# Patient Record
Sex: FEMALE | Race: WHITE
[De-identification: names, ages, dates, MRNs, and addresses within clinical notes are randomized per-mention and may not be internally consistent; named-entity substitution may affect disease eponyms.]

---

## 2017-11-18 ENCOUNTER — HOSPITAL ENCOUNTER (EMERGENCY)
Dept: HOSPITAL 10 - FTE | Age: 32
Discharge: HOME | End: 2017-11-18
Payer: COMMERCIAL

## 2017-11-18 VITALS
BODY MASS INDEX: 38.96 KG/M2 | HEIGHT: 61 IN | WEIGHT: 206.35 LBS | WEIGHT: 206.35 LBS | BODY MASS INDEX: 38.96 KG/M2 | HEIGHT: 61 IN

## 2017-11-18 DIAGNOSIS — L50.0: Primary | ICD-10-CM

## 2017-11-18 PROCEDURE — 96372 THER/PROPH/DIAG INJ SC/IM: CPT

## 2017-11-18 NOTE — ERD
ER Documentation


Chief Complaint


Chief Complaint


PT HAS HIVES ALLERGY NO SOB,





HPI


Neuro male presents with an allergic reaction.  He has not had allergies much 

in her life over the last 30 days she started to get allergic reactions where 

she gets very itchy and gets redness and spots on her skin.  Today she had a 

reaction where hot water with lemon cleared up completely.  She had another 

reaction when she actually believed her tongue was swelling.  She states that 

that is completely resolved her itching is getting better but she still is red 

and very itchy.  She cannot think of any known changes or allergens.  She 

started spoken with her doctor who recommends allergy testing.





ROS


All systems reviewed and are negative except as per history of present illness.





Medications


Home Meds


Active Scripts


Epinephrine (Epipen 2-Ernesto) 0.3 Mg/0.3 Ml Pen.injctr, 0.3 MG IM AS DIRECTED Y 

for ALLERGIC REACTION, #1 EA


   Prov:LEONEL ROYAL DO         11/18/17


Prednisone* (Prednisone*) 20 Mg Tab, 40 MG PO DAILY, #4 TAB


   Prov:LEONEL ROYAL DO         11/18/17


Famotidine* (Famotidine*) 20 Mg Tablet, 20 MG PO BID, #8 TAB


   Prov:LEONEL ROYAL DO         11/18/17


Hydroxyzine Hcl* (Atarax*) 25 Mg Tab, 25 MG PO Q6H, #20 TAB


   Prov:LEONEL ROYAL DO         11/18/17





PMhx/Soc


Medical and Surgical Hx:  pt denies Medical Hx, pt denies Surgical Hx


Hx Alcohol Use:  No


Hx Substance Use:  No


Hx Tobacco Use:  No


Smoking Status:  Never smoker





Physical Exam


Vitals





Vital Signs








  Date Time  Temp Pulse Resp B/P Pulse Ox O2 Delivery O2 Flow Rate FiO2


 


11/18/17 20:29 99.4 106 18 180/84 100   








Physical Exam


Const:  []          Mild to moderate distress, scratching skin persistently.


Head:   Atraumatic 


Eyes:    Normal Conjunctiva


ENT:    Normal External Ears, Nose and Mouth.  Tongue is normal size, 

oropharynx is within normal limits with no edema.


Resp:    Clear to auscultation bilaterally


Cardio:    Regular rate and rhythm, no murmurs


Skin:    No petechiae, erythema and blotches different parts of the trunk 

extremities and neck.


Ext:    No cyanosis, or edema


Results 24 hrs





 Current Medications








 Medications


  (Trade)  Dose


 Ordered  Sig/Chito


 Route


 PRN Reason  Start Time


 Stop Time Status Last Admin


Dose Admin


 


 Diphenhydramine


 HCl


  (Benadryl)  50 mg  ONCE  ONCE


 IM


   11/18/17 22:00


 11/18/17 22:01 DC 11/18/17 22:13


 


 


 Prednisone


  (Prednisone)  60 mg  ONCE  ONCE


 PO


   11/18/17 22:00


 11/18/17 22:01 DC 11/18/17 22:13


 


 


 Famotidine


  (Pepcid)  20 mg  ONCE  ONCE


 PO


   11/18/17 22:00


 11/18/17 22:01 DC 11/18/17 22:13


 











Procedures/MDM


Allergic reaction appears to be moderate and possibly was severe as her tongue 

was swelling.  She has no shortness of breath now has clear lungs had erythema.

  She was given 50 mg of IM Benadryl as well as a p.o. Pepcid and p.o. 60 mg 

prednisone tablet.  She is feeling much better erythema has disappeared.  We 

will discharge her with hydroxyzine, Pepcid, prednisone for 4 days.  I am also 

discharge with EpiPen 2 pack because she had some tongue swelling and this may 

save her life if she has more severe allergic reaction.  Also told her she 

should follow through the allergy testing and call first thing on Monday to 

arrange that through her primary care doctor.  Return precautions.





Departure


Diagnosis:  


 Primary Impression:  


 Allergic reaction


Condition:  Stable


Patient Instructions:  Allergic Reaction, Other (General)





Additional Instructions:  


Call your primary care doctor TOMORROW for an appointment during the next 1-2 

days.See the doctor sooner or return here if your condition worsens before your 

appointment time.











LEONEL ROYAL DO Nov 18, 2017 22:59

## 2018-02-05 ENCOUNTER — HOSPITAL ENCOUNTER (EMERGENCY)
Age: 33
Discharge: HOME | End: 2018-02-05

## 2018-02-05 ENCOUNTER — HOSPITAL ENCOUNTER (EMERGENCY)
Dept: HOSPITAL 91 - FTE | Age: 33
Discharge: HOME | End: 2018-02-05
Payer: COMMERCIAL

## 2018-02-05 DIAGNOSIS — J06.9: Primary | ICD-10-CM

## 2018-02-05 PROCEDURE — 99283 EMERGENCY DEPT VISIT LOW MDM: CPT
